# Patient Record
Sex: MALE | Race: WHITE | NOT HISPANIC OR LATINO | Employment: OTHER | URBAN - METROPOLITAN AREA
[De-identification: names, ages, dates, MRNs, and addresses within clinical notes are randomized per-mention and may not be internally consistent; named-entity substitution may affect disease eponyms.]

---

## 2020-08-01 ENCOUNTER — HOSPITAL ENCOUNTER (EMERGENCY)
Facility: HOSPITAL | Age: 75
Discharge: HOME | End: 2020-08-01
Attending: EMERGENCY MEDICINE | Admitting: EMERGENCY MEDICINE

## 2020-08-01 ENCOUNTER — APPOINTMENT (OUTPATIENT)
Dept: CT IMAGING | Facility: HOSPITAL | Age: 75
End: 2020-08-01

## 2020-08-01 VITALS
WEIGHT: 195 LBS | TEMPERATURE: 97.9 F | HEART RATE: 68 BPM | SYSTOLIC BLOOD PRESSURE: 187 MMHG | OXYGEN SATURATION: 98 % | HEIGHT: 75 IN | DIASTOLIC BLOOD PRESSURE: 99 MMHG | BODY MASS INDEX: 24.25 KG/M2 | RESPIRATION RATE: 18 BRPM

## 2020-08-01 DIAGNOSIS — G44.209 ACUTE NON INTRACTABLE TENSION-TYPE HEADACHE: Primary | ICD-10-CM

## 2020-08-01 LAB
ANION GAP SERPL CALCULATED.3IONS-SCNC: 10.3 MMOL/L (ref 5–15)
BASOPHILS # BLD AUTO: 0.04 10*3/MM3 (ref 0–0.2)
BASOPHILS NFR BLD AUTO: 0.9 % (ref 0–1.5)
BUN SERPL-MCNC: 21 MG/DL (ref 8–23)
BUN/CREAT SERPL: 18.8 (ref 7–25)
CALCIUM SPEC-SCNC: 8.9 MG/DL (ref 8.6–10.5)
CHLORIDE SERPL-SCNC: 100 MMOL/L (ref 98–107)
CO2 SERPL-SCNC: 21.7 MMOL/L (ref 22–29)
CREAT SERPL-MCNC: 1.12 MG/DL (ref 0.76–1.27)
DEPRECATED RDW RBC AUTO: 49.1 FL (ref 37–54)
EOSINOPHIL # BLD AUTO: 0.11 10*3/MM3 (ref 0–0.4)
EOSINOPHIL NFR BLD AUTO: 2.4 % (ref 0.3–6.2)
ERYTHROCYTE [DISTWIDTH] IN BLOOD BY AUTOMATED COUNT: 13.7 % (ref 12.3–15.4)
GFR SERPL CREATININE-BSD FRML MDRD: 64 ML/MIN/1.73
GLUCOSE SERPL-MCNC: 105 MG/DL (ref 65–99)
HCT VFR BLD AUTO: 35.8 % (ref 37.5–51)
HGB BLD-MCNC: 11.6 G/DL (ref 13–17.7)
IMM GRANULOCYTES # BLD AUTO: 0.01 10*3/MM3 (ref 0–0.05)
IMM GRANULOCYTES NFR BLD AUTO: 0.2 % (ref 0–0.5)
LYMPHOCYTES # BLD AUTO: 1.36 10*3/MM3 (ref 0.7–3.1)
LYMPHOCYTES NFR BLD AUTO: 29.6 % (ref 19.6–45.3)
MCH RBC QN AUTO: 31 PG (ref 26.6–33)
MCHC RBC AUTO-ENTMCNC: 32.4 G/DL (ref 31.5–35.7)
MCV RBC AUTO: 95.7 FL (ref 79–97)
MONOCYTES # BLD AUTO: 0.37 10*3/MM3 (ref 0.1–0.9)
MONOCYTES NFR BLD AUTO: 8 % (ref 5–12)
NEUTROPHILS NFR BLD AUTO: 2.71 10*3/MM3 (ref 1.7–7)
NEUTROPHILS NFR BLD AUTO: 58.9 % (ref 42.7–76)
PLATELET # BLD AUTO: 195 10*3/MM3 (ref 140–450)
PMV BLD AUTO: 8.7 FL (ref 6–12)
POTASSIUM SERPL-SCNC: 4.3 MMOL/L (ref 3.5–5.2)
RBC # BLD AUTO: 3.74 10*6/MM3 (ref 4.14–5.8)
SODIUM SERPL-SCNC: 132 MMOL/L (ref 136–145)
WBC # BLD AUTO: 4.6 10*3/MM3 (ref 3.4–10.8)

## 2020-08-01 PROCEDURE — 72129 CT CHEST SPINE W/DYE: CPT

## 2020-08-01 PROCEDURE — 99284 EMERGENCY DEPT VISIT MOD MDM: CPT

## 2020-08-01 PROCEDURE — 70450 CT HEAD/BRAIN W/O DYE: CPT

## 2020-08-01 PROCEDURE — 85025 COMPLETE CBC W/AUTO DIFF WBC: CPT | Performed by: EMERGENCY MEDICINE

## 2020-08-01 PROCEDURE — 80048 BASIC METABOLIC PNL TOTAL CA: CPT | Performed by: EMERGENCY MEDICINE

## 2020-08-01 PROCEDURE — 99282 EMERGENCY DEPT VISIT SF MDM: CPT | Performed by: EMERGENCY MEDICINE

## 2020-08-01 PROCEDURE — 0 IOPAMIDOL PER 1 ML: Performed by: EMERGENCY MEDICINE

## 2020-08-01 RX ORDER — SODIUM CHLORIDE 0.9 % (FLUSH) 0.9 %
10 SYRINGE (ML) INJECTION AS NEEDED
Status: DISCONTINUED | OUTPATIENT
Start: 2020-08-01 | End: 2020-08-01 | Stop reason: HOSPADM

## 2020-08-01 RX ORDER — OXYCODONE HYDROCHLORIDE AND ACETAMINOPHEN 5; 325 MG/1; MG/1
1 TABLET ORAL EVERY 6 HOURS PRN
COMMUNITY
End: 2021-04-25

## 2020-08-01 RX ORDER — CELECOXIB 200 MG/1
200 CAPSULE ORAL 2 TIMES DAILY
COMMUNITY

## 2020-08-01 RX ORDER — OXYCODONE HYDROCHLORIDE AND ACETAMINOPHEN 5; 325 MG/1; MG/1
1 TABLET ORAL ONCE
Status: COMPLETED | OUTPATIENT
Start: 2020-08-01 | End: 2020-08-01

## 2020-08-01 RX ADMIN — IOPAMIDOL 100 ML: 755 INJECTION, SOLUTION INTRAVENOUS at 19:39

## 2020-08-01 RX ADMIN — OXYCODONE AND ACETAMINOPHEN 1 TABLET: 5; 325 TABLET ORAL at 17:53

## 2020-08-02 NOTE — ED PROVIDER NOTES
Subjective   Rommel Santos is a 75 yo WM who present secondary to headache.  Insidious onset of headache yesterday.  Headache involves both frontal and occipital regions.  Headache continues today.  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  He and his family became concerned about his headache.  A family member had a forehead thermometer.  However it was set on Celsius.  The attempted to convert to Fahrenheit.  They felt the patient's temperature was 101.5.  However they then used an oral thermometer which returned a temperature of 97.  Nonetheless patient elected to come to the ER for evaluation.      History provided by:  Patient      Review of Systems   Constitutional: Negative for fever.   HENT: Negative for rhinorrhea.    Eyes: Negative for redness.   Respiratory: Negative for cough.    Cardiovascular: Negative for chest pain.   Gastrointestinal: Negative for abdominal pain.   Genitourinary: Negative for dysuria.   Musculoskeletal: Negative for myalgias.   Skin: Negative for rash.   Neurological: Positive for headaches. Negative for syncope.   All other systems reviewed and are negative.      Past Medical History:   Diagnosis Date   • Atrial fibrillation (CMS/HCC)    • Duodenal ulcer    • Urinary frequency        No Known Allergies    Past Surgical History:   Procedure Laterality Date   • CARDIAC ABLATION     • CHOLECYSTECTOMY     • GASTRECTOMY PARTIAL / TOTAL     • REPLACEMENT TOTAL KNEE Bilateral    • THORACIC SPINE SURGERY      arrachnoid bleb removed @ [facility]       History reviewed. No pertinent family history.    Social History     Socioeconomic History   • Marital status:      Spouse name: Not on file   • Number of children: Not on file   • Years of education: Not on file   • Highest education level: Not on file   Tobacco Use   • Smoking status: Former Smoker   Substance and Sexual Activity   • Alcohol use: Never     Frequency: Never   • Drug use: Not Currently   •  Sexual activity: Defer           Objective   Physical Exam   Constitutional: He is oriented to person, place, and time. He appears well-developed and well-nourished. No distress.   74-year-old white male lying in bed.  Patient appears in good overall health.  He is friendly and cooperative.  Vital signs are unremarkable.  Patient's temperature triage is 97.8.   HENT:   Head: Normocephalic and atraumatic.   Right Ear: External ear normal.   Left Ear: External ear normal.   Nose: Nose normal.   Mouth/Throat: Oropharynx is clear and moist.   Eyes: Pupils are equal, round, and reactive to light. Conjunctivae and EOM are normal.   Neck: Normal range of motion. Neck supple. No neck rigidity. Normal range of motion present. No Brudzinski's sign and no Kernig's sign noted.   Cardiovascular: Normal rate, regular rhythm, normal heart sounds and intact distal pulses. Exam reveals no gallop and no friction rub.   No murmur heard.  Pulmonary/Chest: Effort normal and breath sounds normal. No stridor. No respiratory distress. He has no wheezes. He has no rales.   Abdominal: Soft. He exhibits no distension. There is no tenderness.   Musculoskeletal: Normal range of motion. He exhibits no edema.        Thoracic back: He exhibits no swelling, no edema and no deformity.        Back:    Neurological: He is alert and oriented to person, place, and time. He has normal reflexes. No cranial nerve deficit.   Skin: Skin is warm and dry. No rash noted. He is not diaphoretic. No erythema.   Psychiatric: He has a normal mood and affect. His behavior is normal.   Nursing note and vitals reviewed.      Procedures           ED Course  ED Course as of Aug 01 2019   Sat Aug 01, 2020   1737 Kernig and Brezinski signs are negative.  No evidence of meningitis based on physical exam.  Patient is afebrile.  Have discussed with Dr. May-radiologist he recommends obtaining a CT head without contrast and a CT thoracic spine without and with contrast.   Obtaining baseline labs.  Have discussed with patient radiologist recommendations.  He is agreeable to testing.    [SS]   1834 CBC notable for minimal anemia with hemoglobin 11.6 hematocrit 35.8.  Sodium slightly low at 132.  CO2 21.7.  Glucose 105.  No significant electrolyte abnormalities.  Kidney function is normal with BUN of 21 creatinine 1.12    [SS]   2009 CT head shows only chronic ischemic changes.  CT C-spine shows postsurgical changes but otherwise unremarkable.  No sign of intracerebral hemorrhage.  No sign of postsurgical fluid collections.  I feel patient's headache is tension in nature.I have discussed at length with patient all results, diagnoses, treatment and follow-up.    Will DC home.Mr Santos is from [place].  He was a  in [place] for 32 years.  Moved to this area 6 weeks ago.    [SS]      ED Course User Index  [SS] Charles Watters MD      Labs Reviewed   BASIC METABOLIC PANEL - Abnormal; Notable for the following components:       Result Value    Glucose 105 (*)     Sodium 132 (*)     CO2 21.7 (*)     All other components within normal limits    Narrative:     GFR Normal >60  Chronic Kidney Disease <60  Kidney Failure <15     CBC WITH AUTO DIFFERENTIAL - Abnormal; Notable for the following components:    RBC 3.74 (*)     Hemoglobin 11.6 (*)     Hematocrit 35.8 (*)     All other components within normal limits   CBC AND DIFFERENTIAL    Narrative:     The following orders were created for panel order CBC & Differential.  Procedure                               Abnormality         Status                     ---------                               -----------         ------                     CBC Auto Differential[[id number]]        Abnormal            Final result                 Please view results for these tests on the individual orders.     Ct Head Without Contrast    Result Date: 8/1/2020  Narrative: PROCEDURE: CT Head WO COMPARISON: No relevant comparison or correlation  studies available at time of dictation. INDICATIONS: Acute headache today TECHNIQUE:  CT examination of the brain is performed without IV contrast. Radiation dose reduction techniques included automated exposure control or exposure modulation based on body size.  Count of known CT and cardiac nuc med studies performed in previous 12 months: 2.  FINDINGS:   There is no evidence of acute intracranial hemorrhage, mass effect or midline shift. No intra-axial or extra-axial fluid collections. There is mild cortical atrophy. Periventricular low attenuation is likely secondary to small vessel ischemic disease. The ventricular system is not dilated.  The calvarium is intact. Minimal opacification of the mastoid air cells seen inferiorly without bone destruction. For a     Impression: No acute intracranial process. Probable small vessel ischemic disease.  Signer Name: Fabiana Parham MD  Signed: 8/1/2020 7:37 PM  Workstation Name: [facility]  [facility]    Ct Thoracic Spine With Contrast    Result Date: 8/1/2020  Narrative: PROCEDURE: CT Spine Thoracic W COMPARISON: No prior studies are relevant studies available for correlation purposes at the time of this dictation. INDICATIONS: Mid-back/thoracic spine pain with associated headaches that started today. Exam done with and wo contrast. ;Mid T-spine pain and HA x today;5 weeks post op arachnoid web surgery;No trauma TECHNIQUE:  Routine CT of the thoracic is performed without contrast. Sagittal and coronal reconstructions performed and reviewed. Radiation dose reduction techniques included automated exposure control or exposure modulation based on body size.  Count of known CT and cardiac nuc med studies performed in previous 12 months: 2. . FINDINGS: Postoperative change noted with posterior element resection from T5 to T7. Nonrim-enhancing fluid density seen in the postoperative defect. Calcification seen along the posterior aspect of the cord at  T7, indeterminant. Prior imaging not available. There is no acute malalignment. Vertebral body heights are fairly well-preserved in the thoracic spine.  No paravertebral hematoma present.There is no acute fracture of the thoracic spine. No abnormal enhancement present. Mild compression deformity of L1 noted, age indeterminant. Right trace pleural effusions identified. Basilar airspace disease present. Numerous clips seen at the GE junction. Incidental benign type I Bosniak simple cyst seen right kidney; macro no follow-up.     Impression: 1. Compression deformity of L1 seen of indeterminate age without comparisons, correlate with past medical history. 2. Evidence of postoperative change in the posterior elements of T7 with no abnormal enhancement. Signer Name: Fabiana Parham MD  Signed: 8/1/2020 7:49 PM  Workstation Name: [facility]  [facility] [place]    My differential diagnosis for headache includes but is not limited to:  Migraine, cluster, ischemic stroke, subarachnoid hemorrhage, intracranial hemorrhage, vascular malformation, cerebral aneurysm, vascular dissection, vasculitis, temporal arteritis, malignant hypertension, pheochromocytoma, cerebral venous thrombosis, preeclampsia; bacterial meningitis, viral meningitis, fungal meningitis, encephalitis, brain abscess, pleural empyema, sinusitis, dental infection, influenza, viral syndrome; carbon monoxide exposure, analgesic abuse, hypoglycemia; trigeminal neuralgia, postherpetic neuralgia, occipital neuralgia; subdural hematoma, concussion, musculoskeletal tension, cervical osteoarthritis; glaucoma, TMJ disease, pseudotumor cerebri, post LP headache, intracranial neoplasm, sleep apnea    My differential diagnosis for back pain includes but is not limited to:  Musculoskeletal strain, contusion, retroperitoneal hematoma, disc protrusion, vertebral fracture, transverse process fracture, rib fracture, facet syndrome, sacroiliac joint strain,  sciatica, renal injury, splenic injury, pancreatic injury, osteoarthritis, lumbar spondylosis, spinal stenosis, ankylosing spondylitis, sacroiliac joint inflammation, pancreatitis, perforated peptic ulcer, diverticulitis, endometriosis, chronic PID, epidural abscess, osteomyelitis, retroperitoneal abscess, pyelonephritis, pneumonia, subphrenic abscess, tuberculosis, neurofibroma, meningioma, multiple myeloma, lymphoma, metastatic cancer, primary cancer, AAA, aortic dissection, spinal ischemia, referred pain, ureterolithiasis                                       MDM    Final diagnoses:   Acute non intractable tension-type headache            Charles Watters MD  08/01/20 2019

## 2020-08-02 NOTE — DISCHARGE INSTRUCTIONS
Continue current medications.  Follow-up with your neurosurgeon as above.  Return to ED for worsening symptoms, medical emergencies.

## 2021-04-25 ENCOUNTER — APPOINTMENT (OUTPATIENT)
Dept: GENERAL RADIOLOGY | Facility: HOSPITAL | Age: 76
End: 2021-04-25

## 2021-04-25 ENCOUNTER — HOSPITAL ENCOUNTER (EMERGENCY)
Facility: HOSPITAL | Age: 76
Discharge: HOME | End: 2021-04-25
Attending: EMERGENCY MEDICINE | Admitting: EMERGENCY MEDICINE

## 2021-04-25 VITALS
TEMPERATURE: 97.6 F | HEART RATE: 70 BPM | OXYGEN SATURATION: 98 % | SYSTOLIC BLOOD PRESSURE: 169 MMHG | RESPIRATION RATE: 16 BRPM | WEIGHT: 200 LBS | HEIGHT: 71 IN | DIASTOLIC BLOOD PRESSURE: 86 MMHG | BODY MASS INDEX: 28 KG/M2

## 2021-04-25 DIAGNOSIS — K59.00 CONSTIPATION, UNSPECIFIED CONSTIPATION TYPE: Primary | ICD-10-CM

## 2021-04-25 LAB
BASOPHILS # BLD AUTO: 0.06 10*3/MM3 (ref 0–0.2)
BASOPHILS NFR BLD AUTO: 1 % (ref 0–1.5)
DEPRECATED RDW RBC AUTO: 48.7 FL (ref 37–54)
EOSINOPHIL # BLD AUTO: 0.19 10*3/MM3 (ref 0–0.4)
EOSINOPHIL NFR BLD AUTO: 3.3 % (ref 0.3–6.2)
ERYTHROCYTE [DISTWIDTH] IN BLOOD BY AUTOMATED COUNT: 13.5 % (ref 12.3–15.4)
HCT VFR BLD AUTO: 42.9 % (ref 37.5–51)
HGB BLD-MCNC: 14.1 G/DL (ref 13–17.7)
IMM GRANULOCYTES # BLD AUTO: 0.02 10*3/MM3 (ref 0–0.05)
IMM GRANULOCYTES NFR BLD AUTO: 0.3 % (ref 0–0.5)
LYMPHOCYTES # BLD AUTO: 2.47 10*3/MM3 (ref 0.7–3.1)
LYMPHOCYTES NFR BLD AUTO: 42.7 % (ref 19.6–45.3)
MCH RBC QN AUTO: 32 PG (ref 26.6–33)
MCHC RBC AUTO-ENTMCNC: 32.9 G/DL (ref 31.5–35.7)
MCV RBC AUTO: 97.3 FL (ref 79–97)
MONOCYTES # BLD AUTO: 0.66 10*3/MM3 (ref 0.1–0.9)
MONOCYTES NFR BLD AUTO: 11.4 % (ref 5–12)
NEUTROPHILS NFR BLD AUTO: 2.39 10*3/MM3 (ref 1.7–7)
NEUTROPHILS NFR BLD AUTO: 41.3 % (ref 42.7–76)
NRBC BLD AUTO-RTO: 0 /100 WBC (ref 0–0.2)
PLATELET # BLD AUTO: 233 10*3/MM3 (ref 140–450)
PMV BLD AUTO: 9.1 FL (ref 6–12)
RBC # BLD AUTO: 4.41 10*6/MM3 (ref 4.14–5.8)
WBC # BLD AUTO: 5.79 10*3/MM3 (ref 3.4–10.8)

## 2021-04-25 PROCEDURE — 85025 COMPLETE CBC W/AUTO DIFF WBC: CPT | Performed by: PHYSICIAN ASSISTANT

## 2021-04-25 PROCEDURE — 36415 COLL VENOUS BLD VENIPUNCTURE: CPT

## 2021-04-25 PROCEDURE — 99283 EMERGENCY DEPT VISIT LOW MDM: CPT

## 2021-04-25 PROCEDURE — 74019 RADEX ABDOMEN 2 VIEWS: CPT

## 2021-04-25 RX ORDER — TIZANIDINE 2 MG/1
2 TABLET ORAL EVERY 8 HOURS PRN
COMMUNITY
Start: 2021-02-19

## 2021-04-25 RX ORDER — UBIDECARENONE 75 MG
100 CAPSULE ORAL DAILY
COMMUNITY

## 2021-04-25 RX ORDER — PSEUDOEPHEDRINE HCL 30 MG
200 TABLET ORAL 2 TIMES DAILY PRN
COMMUNITY

## 2021-04-25 RX ORDER — SENNA PLUS 8.6 MG/1
8.6 TABLET ORAL DAILY
COMMUNITY

## 2021-04-25 RX ORDER — ASPIRIN 81 MG/1
81 TABLET ORAL DAILY
COMMUNITY

## 2021-04-25 RX ORDER — CHLORAL HYDRATE 500 MG
1 CAPSULE ORAL DAILY
COMMUNITY

## 2021-04-25 NOTE — ED NOTES
Pt unable to hold soap hollis enema fluid. Output fluids blood tinged. Grace ROSADO notified & @ bedside. Enema stopped. Pt received about 3/4 of fluid. Pt on bedside commode @ this time with no c/o pain. Will continue to monitor     Jessica Salcedo, RN  04/25/21 [id number]

## 2021-04-25 NOTE — ED PROVIDER NOTES
EMERGENCY DEPARTMENT ENCOUNTER      Room Number: 09/09    History is provided by the patient, no translation services needed    HPI:    Chief complaint: Constipation    Location: At home    Quality/Severity: Moderate    Timing/Duration: 3 days    Modifying Factors: Patient has tried mag citrate, Senokot, stool softeners and an enema without relief    Associated Symptoms: No abdominal pain no rectal bleeding    Narrative: Pt is a 75 y.o. male who presents complaining of constipation x3 days.  Patient dates he is been unable to have his bowel movement.  Patient states that he steps tried several times with different things but has had no success.  Patient denies any abdominal pain or rectal bleeding.      PMD: Sue Auguste MD    REVIEW OF SYSTEMS  Review of Systems   Constitutional: Negative for chills and fever.   Eyes: Negative for pain and visual disturbance.   Respiratory: Negative for cough and shortness of breath.    Cardiovascular: Negative for chest pain and palpitations.   Gastrointestinal: Positive for constipation. Negative for abdominal pain, nausea and vomiting.   Genitourinary: Negative for difficulty urinating, dysuria and flank pain.   Musculoskeletal: Negative for gait problem and myalgias.   Skin: Negative for rash and wound.   Neurological: Negative for dizziness, syncope, numbness and headaches.   Psychiatric/Behavioral: Negative for confusion and suicidal ideas. The patient is not nervous/anxious.          PAST MEDICAL HISTORY  Active Ambulatory Problems     Diagnosis Date Noted   • No Active Ambulatory Problems     Resolved Ambulatory Problems     Diagnosis Date Noted   • No Resolved Ambulatory Problems     Past Medical History:   Diagnosis Date   • Atrial fibrillation (CMS/HCC)    • Duodenal ulcer    • Urinary frequency        PAST SURGICAL HISTORY  Past Surgical History:   Procedure Laterality Date   • BACK SURGERY     • CARDIAC ABLATION     • CHOLECYSTECTOMY     • GASTRECTOMY  PARTIAL / TOTAL     • REPLACEMENT TOTAL KNEE Bilateral    • THORACIC SPINE SURGERY      arrachnoid bleb removed @ [facility]       FAMILY HISTORY  History reviewed. No pertinent family history.    SOCIAL HISTORY  Social History     Socioeconomic History   • Marital status:      Spouse name: Not on file   • Number of children: Not on file   • Years of education: Not on file   • Highest education level: Not on file   Tobacco Use   • Smoking status: Former Smoker   Substance and Sexual Activity   • Alcohol use: Never   • Drug use: Not Currently   • Sexual activity: Defer       ALLERGIES  Patient has no known allergies.    No current facility-administered medications for this encounter.    Current Outpatient Medications:   •  celecoxib (CeleBREX) 200 MG capsule, Take 200 mg by mouth 2 (Two) Times a Day., Disp: , Rfl:   •  tiZANidine (ZANAFLEX) 2 MG tablet, Take 2 mg by mouth Every 8 (Eight) Hours As Needed., Disp: , Rfl:   •  aspirin (aspirin) 81 MG EC tablet, Take 81 mg by mouth Daily., Disp: , Rfl:   •  docusate sodium 100 MG capsule, Take 200 mg by mouth 2 (Two) Times a Day As Needed., Disp: , Rfl:   •  Omega-3 Fatty Acids (fish oil) 1000 MG capsule capsule, Take 1 capsule by mouth Daily., Disp: , Rfl:   •  polyethylene glycol (GoLYTELY) 236 g solution, Take 4,000 mL by mouth 1 (One) Time for 1 dose., Disp: 4000 mL, Rfl: 0  •  senna (Senna-Time) 8.6 MG tablet, Take 8.6 mg by mouth Daily., Disp: , Rfl:   •  vitamin B-12 (cyanocobalamin) 100 MCG tablet, Take 100 mcg by mouth Daily., Disp: , Rfl:     PHYSICAL EXAM  ED Triage Vitals   Temp Heart Rate Resp BP SpO2   04/25/21 1222 04/25/21 1233 04/25/21 1233 04/25/21 1233 04/25/21 1233   97.9 °F (36.6 °C) 90 18 131/77 96 %      Temp src Heart Rate Source Patient Position BP Location FiO2 (%)   04/25/21 1222 04/25/21 1233 04/25/21 1233 04/25/21 1233 --   Skin Monitor Sitting Left arm        Physical Exam  Vitals and nursing note reviewed.   HENT:      Head:  Normocephalic and atraumatic.   Eyes:      Conjunctiva/sclera: Conjunctivae normal.   Cardiovascular:      Rate and Rhythm: Normal rate and regular rhythm.      Heart sounds: Normal heart sounds.   Pulmonary:      Effort: Pulmonary effort is normal. No respiratory distress.      Breath sounds: Normal breath sounds.   Abdominal:      General: Bowel sounds are normal. There is no distension.      Palpations: Abdomen is soft.      Tenderness: There is no abdominal tenderness.   Musculoskeletal:         General: Normal range of motion.      Cervical back: Normal range of motion and neck supple.   Skin:     General: Skin is warm and dry.   Neurological:      Mental Status: He is alert and oriented to person, place, and time.   Psychiatric:         Mood and Affect: Mood and affect normal.           LAB RESULTS  Lab Results (last 24 hours)     Procedure Component Value Units Date/Time    CBC & Differential [[id number]]  (Abnormal) Collected: 04/25/21 1412    Specimen: Blood Updated: 04/25/21 1419    Narrative:      The following orders were created for panel order CBC & Differential.  Procedure                               Abnormality         Status                     ---------                               -----------         ------                     CBC Auto Differential[[id number]]        Abnormal            Final result                 Please view results for these tests on the individual orders.    CBC Auto Differential [[id number]]  (Abnormal) Collected: 04/25/21 1412    Specimen: Blood Updated: 04/25/21 1419     WBC 5.79 10*3/mm3      RBC 4.41 10*6/mm3      Hemoglobin 14.1 g/dL      Hematocrit 42.9 %      MCV 97.3 fL      MCH 32.0 pg      MCHC 32.9 g/dL      RDW 13.5 %      RDW-SD 48.7 fl      MPV 9.1 fL      Platelets 233 10*3/mm3      Neutrophil % 41.3 %      Lymphocyte % 42.7 %      Monocyte % 11.4 %      Eosinophil % 3.3 %      Basophil % 1.0 %      Immature Grans % 0.3 %      Neutrophils, Absolute 2.39  10*3/mm3      Lymphocytes, Absolute 2.47 10*3/mm3      Monocytes, Absolute 0.66 10*3/mm3      Eosinophils, Absolute 0.19 10*3/mm3      Basophils, Absolute 0.06 10*3/mm3      Immature Grans, Absolute 0.02 10*3/mm3      nRBC 0.0 /100 WBC             I ordered the above labs and reviewed the results    RADIOLOGY  XR Abdomen Flat & Upright    Result Date: 4/25/2021  CR Abdomen AP W Decub or Erect INDICATION: Constipation 4 days. History of duodenal ulcer repair and cholecystectomy. COMPARISON: None available FINDINGS: Upright and supine AP radiographs of the abdomen. Nonobstructive bowel gas pattern. Increased colonic stool burden consistent with patient's history of constipation but no evidence of impaction. No organomegaly. No abnormal calcifications. Surgical clips distal esophagus and GE junction. Presumed embolization coils left upper quadrant. Multilevel degenerative changes lumbar spine. Lung bases unremarkable. No free air.     Increased colonic stool burden consistent with reported constipation. No impaction or high-grade obstruction. Signer Name: YANIQUE Luevano MD  Signed: 4/25/2021 2:07 PM  Workstation Name: [email or web address]  [facility] [place]      I ordered the above radiologic testing and reviewed the results    PROCEDURES  Procedures      PROGRESS AND CONSULTS  ED Course as of Apr 25 1854   Sun Apr 25, 2021   1850 75-year-old male presents to the ED with complaints of not having a bowel movement x3 days.  Patient states that he has tried Senokot, magnesium citrate, and enema without relief.  Patient denies any rectal bleeding or abdominal pain.  After history and physical exam and x-ray was ordered to rule out fecal impaction.  Patient was also given an enema with no relief.  X-ray showed no acute fecal impaction.  Patient was sent home with [employer].  Gave patient instructions to drink 8 ounce glass of GoLYTELY every half an hour until he has a bowel movement.  And then to discuss  discontinue.  Instructed patient that he should return to the ED if he had any worsening symptoms.  Patient expressed verbal understanding of plan of care.    [GT]      ED Course User Index  [GT] Sandra Guthrie PA-C           MEDICAL DECISION MAKING    MDM       DIAGNOSIS  Final diagnoses:   Constipation, unspecified constipation type       Latest Documented Vital Signs:  As of 18:54 EDT  BP- 169/86 HR- 70 Temp- 97.6 °F (36.4 °C) (Oral) O2 sat- 98%    DISPOSITION  Discharged home        Discussed pertinent findings with the patient/family.  Patient/Family voiced understanding of need to follow-up for recheck and further testing as needed.  Return to the Emergency Department warnings were given.         Medication List      New Prescriptions    polyethylene glycol 236 g solution  Commonly known as: GoLYTELY  Take 4,000 mL by mouth 1 (One) Time for 1 dose.           Where to Get Your Medications      You can get these medications from any pharmacy    Bring a paper prescription for each of these medications  · polyethylene glycol 236 g solution             Follow-up Information     Sue Auguste MD. Call in 1 day.    Specialty: Family Medicine  Why: To schedule a follow up appointment  Contact information:  [address] [place] [address]  [phone]                     Dictated utilizing Dragon dictation     Sandra Guthrie PA-C  04/25/21 4803

## 2021-04-25 NOTE — ED NOTES
Spoke to patients wife Coleen with pt's permission. Updated her on pt status and all questions answered.      Jessica Salcedo, RN  04/25/21 3882

## 2021-06-21 ENCOUNTER — OFFICE VISIT (OUTPATIENT)
Dept: GASTROENTEROLOGY | Facility: CLINIC | Age: 76
End: 2021-06-21

## 2021-06-21 VITALS — WEIGHT: 197.8 LBS | BODY MASS INDEX: 27.69 KG/M2 | HEIGHT: 71 IN

## 2021-06-21 DIAGNOSIS — K59.00 CONSTIPATION, UNSPECIFIED CONSTIPATION TYPE: Primary | ICD-10-CM

## 2021-06-21 PROCEDURE — 99204 OFFICE O/P NEW MOD 45 MIN: CPT | Performed by: NURSE PRACTITIONER

## 2021-06-21 RX ORDER — CHLORAL HYDRATE 500 MG
1 CAPSULE ORAL DAILY
COMMUNITY

## 2021-06-21 RX ORDER — MAGNESIUM 200 MG
400 TABLET ORAL DAILY
COMMUNITY
End: 2022-04-03

## 2021-06-21 RX ORDER — ACETAMINOPHEN 325 MG/1
650 TABLET ORAL EVERY 4 HOURS PRN
COMMUNITY

## 2021-06-21 RX ORDER — BACLOFEN 10 MG/1
TABLET ORAL
COMMUNITY
Start: 2021-05-24 | End: 2021-06-21

## 2021-06-21 NOTE — PROGRESS NOTES
PATIENT INFORMATION  Rommel Santos     - 1945    CHIEF COMPLAINT  Chief Complaint   Patient presents with   • Constipation     referral from Dr. Auguste       HISTORY OF PRESENT ILLNESS  Life history of constipation managed with diet and exercise.  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 and had surgery about a year ago for removal.  Last MRI was 3 months ago without regrowth evidence.    Constipation worsened 3 months ago and has tried senna and dulcolax, benefiber, glycolax.  Stopped everything about 3 days ago but increased his fluids and last 3 days has had bms.  Has appt with Dr.Brooks Greenberg for scope and urolift with his enlarged prostate issues that keep him away with increased fluid intake.  Has been drinking 4 16oz bottles of water throughout the day.  And this has fixed his bowels.      Also recent issues with hb, acid reflux,  Along with the constipation.     Last colon was in  with 10 yr recall.       REVIEWED PERTINENT RESULTS/ LABS  No results found for: CASEREPORT, FINALDX  Lab Results   Component Value Date    HGB 14.1 2021    MCV 97.3 (H) 2021     2021      No results found.    CURRENT MEDICATIONS    Current Outpatient Medications:   •  acetaminophen (TYLENOL) 325 MG tablet, Take 650 mg by mouth Every 4 (Four) Hours As Needed., Disp: , Rfl:   •  aspirin (aspirin) 81 MG EC tablet, Take 81 mg by mouth Daily., Disp: , Rfl:   •  celecoxib (CeleBREX) 200 MG capsule, Take 200 mg by mouth 2 (Two) Times a Day., Disp: , Rfl:   •  docusate sodium 100 MG capsule, Take 200 mg by mouth 2 (Two) Times a Day As Needed., Disp: , Rfl:   •  Magnesium 200 MG tablet, Take 400 mg by mouth Daily., Disp: , Rfl:   •  Omega-3 1000 MG capsule, Take 1 capsule by mouth Daily., Disp: , Rfl:   •  Omega-3 Fatty Acids (fish oil) 1000 MG capsule capsule, Take 1 capsule by mouth Daily., Disp: ,  Rfl:   •  senna (Senna-Time) 8.6 MG tablet, Take 8.6 mg by mouth Daily., Disp: , Rfl:   •  tiZANidine (ZANAFLEX) 2 MG tablet, Take 2 mg by mouth Every 8 (Eight) Hours As Needed., Disp: , Rfl:   •  vitamin B-12 (cyanocobalamin) 100 MCG tablet, Take 100 mcg by mouth Daily., Disp: , Rfl:     ALLERGIES  Patient has no known allergies.    REVIEW OF SYSTEMS  ROS: 14 point review of systems was performed and all other systems were reviewed and are negative except for documented findings in HPI and today's encounter.   Review of Systems   Constitutional: Negative for activity change, chills, fever and unexpected weight change.   HENT: Negative for congestion.    Eyes: Negative for visual disturbance.   Respiratory: Negative for shortness of breath.    Cardiovascular: Negative for chest pain and palpitations.   Gastrointestinal: Positive for constipation and diarrhea. Negative for abdominal pain and blood in stool.   Endocrine: Negative for cold intolerance and heat intolerance.   Genitourinary: Negative for hematuria.   Musculoskeletal: Negative for gait problem.   Skin: Negative for color change.   Allergic/Immunologic: Negative for immunocompromised state.   Neurological: Negative for weakness and light-headedness.   Hematological: Negative for adenopathy.   Psychiatric/Behavioral: Negative for sleep disturbance. The patient is not nervous/anxious.          History     Last Reviewed by Lizzeth Valdes APRN on 6/21/2021 at 11:20 AM    Sections Reviewed    Tobacco, Family, Medical, Surgical      Problem list reviewed by Lizzeth Valdes APRN on 6/21/2021 at 11:20 AM  Medicines reviewed by Lizzeth Valdes APRN on 6/21/2021 at 11:20 AM  Allergies reviewed by Lizzeth Valdes APRN on 6/21/2021 at 11:20 AM      ACTIVE PROBLEMS  There are no problems to display for this patient.        PAST MEDICAL HISTORY  Past Medical History:   Diagnosis Date   • Atrial fibrillation (CMS/HCC)    • Constipation    • Duodenal ulcer    • Urinary  frequency          SURGICAL HISTORY  Past Surgical History:   Procedure Laterality Date   • BACK SURGERY     • CARDIAC ABLATION     • CHOLECYSTECTOMY     • GASTRECTOMY PARTIAL / TOTAL     • REPLACEMENT TOTAL KNEE Bilateral    • THORACIC SPINE SURGERY      arrachnoid bleb removed @ [facility]         FAMILY HISTORY  Family History   Problem Relation Age of Onset   • No Known Problems Mother    • Heart disease Father    • Irritable bowel syndrome Sister    • Heart disease Brother          SOCIAL HISTORY  Social History     Occupational History   • Not on file   Tobacco Use   • Smoking status: Former Smoker   • Smokeless tobacco: Never Used   Substance and Sexual Activity   • Alcohol use: Not Currently     Comment: formerly   • Drug use: Not Currently   • Sexual activity: Defer         LAST RESULTS  See also labs reviewed above.   Admission on 04/25/2021, Discharged on 04/25/2021   Component Date Value Ref Range Status   • WBC 04/25/2021 5.79  3.40 - 10.80 10*3/mm3 Final   • RBC 04/25/2021 4.41  4.14 - 5.80 10*6/mm3 Final   • Hemoglobin 04/25/2021 14.1  13.0 - 17.7 g/dL Final   • Hematocrit 04/25/2021 42.9  37.5 - 51.0 % Final   • MCV 04/25/2021 97.3* 79.0 - 97.0 fL Final   • MCH 04/25/2021 32.0  26.6 - 33.0 pg Final   • MCHC 04/25/2021 32.9  31.5 - 35.7 g/dL Final   • RDW 04/25/2021 13.5  12.3 - 15.4 % Final   • RDW-SD 04/25/2021 48.7  37.0 - 54.0 fl Final   • MPV 04/25/2021 9.1  6.0 - 12.0 fL Final   • Platelets 04/25/2021 233  140 - 450 10*3/mm3 Final   • Neutrophil % 04/25/2021 41.3* 42.7 - 76.0 % Final   • Lymphocyte % 04/25/2021 42.7  19.6 - 45.3 % Final   • Monocyte % 04/25/2021 11.4  5.0 - 12.0 % Final   • Eosinophil % 04/25/2021 3.3  0.3 - 6.2 % Final   • Basophil % 04/25/2021 1.0  0.0 - 1.5 % Final   • Immature Grans % 04/25/2021 0.3  0.0 - 0.5 % Final   • Neutrophils, Absolute 04/25/2021 2.39  1.70 - 7.00 10*3/mm3 Final   • Lymphocytes, Absolute 04/25/2021 2.47  0.70 - 3.10 10*3/mm3 Final   • Monocytes,  "Absolute 04/25/2021 0.66  0.10 - 0.90 10*3/mm3 Final   • Eosinophils, Absolute 04/25/2021 0.19  0.00 - 0.40 10*3/mm3 Final   • Basophils, Absolute 04/25/2021 0.06  0.00 - 0.20 10*3/mm3 Final   • Immature Grans, Absolute 04/25/2021 0.02  0.00 - 0.05 10*3/mm3 Final   • nRBC 04/25/2021 0.0  0.0 - 0.2 /100 WBC Final     No results found.    PHYSICAL EXAM  Debilities/Disabilities Identified: None  Emotional Behavior: Appropriate  75 y.o. male  Wt Readings from Last 3 Encounters:   06/21/21 89.7 kg (197 lb 12.8 oz)   04/25/21 90.7 kg (200 lb)   08/01/20 88.5 kg (195 lb)     Ht Readings from Last 1 Encounters:   06/21/21 180 cm (70.87\")     Body mass index is 27.69 kg/m².  Vitals:    06/21/21 1003   Weight: 89.7 kg (197 lb 12.8 oz)   Height: 180 cm (70.87\")     Vital signs reviewed.          Physical Exam  Vitals and nursing note reviewed.   Constitutional:       Appearance: He is well-developed.   HENT:      Head: Normocephalic and atraumatic.   Eyes:      Conjunctiva/sclera: Conjunctivae normal.      Pupils: Pupils are equal, round, and reactive to light.   Cardiovascular:      Rate and Rhythm: Normal rate and regular rhythm.   Pulmonary:      Effort: Pulmonary effort is normal.      Breath sounds: Normal breath sounds.   Abdominal:      General: Bowel sounds are normal. There is no distension.      Palpations: Abdomen is soft.      Tenderness: There is abdominal tenderness in the epigastric area.   Musculoskeletal:         General: Normal range of motion.      Cervical back: Normal range of motion and neck supple.   Lymphadenopathy:      Cervical: No cervical adenopathy.   Skin:     General: Skin is warm and dry.   Neurological:      Mental Status: He is alert and oriented to person, place, and time.   Psychiatric:         Behavior: Behavior normal.           CLINICAL DATA REVIEWED   reviewed previous lab results and integrated with today's visit, reviewed notes from other physicians and/or last GI encounter, reviewed " "previous endoscopy results and available photos, reviewed surgical pathology results from previous biopsies      ASSESSMENT  Diagnoses and all orders for this visit:    Constipation, unspecified constipation type    Other orders  -     Magnesium 200 MG tablet; Take 400 mg by mouth Daily.  -     acetaminophen (TYLENOL) 325 MG tablet; Take 650 mg by mouth Every 4 (Four) Hours As Needed.  -     Discontinue: baclofen (LIORESAL) 10 MG tablet  -     Omega-3 1000 MG capsule; Take 1 capsule by mouth Daily.          PLAN  Return in about 3 months (around 9/21/2021).     Strongly feel that the constipation is due to inadequate fluid intake which was reduced for your prostate issues.  So best to continue with your plans for treatement and increase your daily fluid intake like you have been doing.  You need at least 8-10 8oz glasses of water daily for your bowels.      Colace (docusate sodium)  100mg 2-3 a day is ok.  IF this does not work you may take Daily miralax 1-4 scoops a day as needed helps to hydrate the colon as well.  Use OTC Gas X, extra strength 120mg you can take 2 pills twice a day.  AVoid senna and dulcolax except for constipation emergencies as these are bowel stimulants.    Increase your daily fiber intake and suggestions below.    Low Acid Diet Instructions:   Don't eat late, don't eat fried or spicy, and don't eat too much at one time. Avoid alcohol and  tomato based products like pizza, lasagna, spaghetti.  If you want to have these take an over the counter Pepcid or TUMS immediately before you eat them.  Do not eat within 3-4 hrs of bedtime. Limit caffeine and carbonated beverages, if you must have them do not have later in the day.  If reflux is severe elevate the head of the bed. You may also use TUMS, maalox, or mylanta for breakthrough heartburn.    Take a daily probiotic (something with a billion cultures and more different strains is better, examples like \"Probiotic 10\" or probiotic gummies) for your " gut as well.  AVOID taking NSAIDS (like ibuprofen, Aleve, Motrin, naproxen, meloxicam, etc) as much as possible and use acetaminophen (Tylenol) instead.    Drink lots of water, eat a high fiber diet with 30-35gm a day(check the fiber content in the foods you eat.  Protein bars with 15gm of fiber in each bar are a great supplement daily), and get regular exercise. Use over the counter simethicone xtra strength gas x (125-180mg) 2 twice a day as needed for gas.     High Fiber Food suggestions:  Beans, nuts, vegetables, whole grains, flax seed and sushila seeds (which can be stirred into other food) are high in fiber.    Iraheta Protein bars from [employer] = 10gm of fiber in each bar (also gluten free)  Quest Protein bars from grocery [employer], Griselda, [employer]= 15gm of fiber each (also gluten free)  Fiber One bars from grocery [employer] = 5-6gm of fiber each  Kelloggs Bran Buds cereal 1/2 cup = 17gm of fiber  Kroger brand low sugar Craisins = 13gm of fiber per serving  Melalueca Fiberwise powder found on [employer]=15gm fiber per serving (2 scoops)  CarbBalance tortillas= 15gm of fiber each  Natural Ovens Keto-Friendly Bread found at [employer]=12gm fiber per serving  No Garfield vegan bar at [employer]=15gm fiber per serving      I have discussed the above plan with the patient.  They verbalize understanding and are in agreement with the plan.  They have been advised to contact the office for any questions, concerns, or changes related to their health.    38 min spent with patient today >50% spent counseling about natural history and expected course of assessed complaint and reviewed treatment options that have been tried and not tried and those currently available. Questions answered.

## 2021-06-21 NOTE — PATIENT INSTRUCTIONS
"Strongly feel that the constipation is due to inadequate fluid intake which was reduced for your prostate issues.  So best to continue with your plans for treatement and increase your daily fluid intake like you have been doing.  You need at least 8-10 8oz glasses of water daily for your bowels.      Colace (docusate sodium)  100mg 2-3 a day is ok.  IF this does not work you may take Daily miralax 1-4 scoops a day as needed helps to hydrate the colon as well.  Use OTC Gas X, extra strength 120mg you can take 2 pills twice a day.  AVoid senna and dulcolax except for constipation emergencies as these are bowel stimulants.    Increase your daily fiber intake and suggestions below.    Low Acid Diet Instructions:   Don't eat late, don't eat fried or spicy, and don't eat too much at one time. Avoid alcohol and  tomato based products like pizza, lasagna, spaghetti.  If you want to have these take an over the counter Pepcid or TUMS immediately before you eat them.  Do not eat within 3-4 hrs of bedtime. Limit caffeine and carbonated beverages, if you must have them do not have later in the day.  If reflux is severe elevate the head of the bed. You may also use TUMS, maalox, or mylanta for breakthrough heartburn.    Take a daily probiotic (something with a billion cultures and more different strains is better, examples like \"Probiotic 10\" or probiotic gummies) for your gut as well.  AVOID taking NSAIDS (like ibuprofen, Aleve, Motrin, naproxen, meloxicam, etc) as much as possible and use acetaminophen (Tylenol) instead.    Drink lots of water, eat a high fiber diet with 30-35gm a day(check the fiber content in the foods you eat.  Protein bars with 15gm of fiber in each bar are a great supplement daily), and get regular exercise. Use over the counter simethicone xtra strength gas x (125-180mg) 2 twice a day as needed for gas.     High Fiber Food suggestions:  Beans, nuts, vegetables, whole grains, flax seed and sushila seeds " (which can be stirred into other food) are high in fiber.    Iraheta Protein bars from [facility] = 10gm of fiber in each bar (also gluten free)  Quest Protein bars from grocery stores [employer], [employer], [employer]= 15gm of fiber each (also gluten free)  Fiber One bars from grocery stores = 5-6gm of fiber each  Kelloggs Bran Buds cereal 1/2 cup = 17gm of fiber  K[employer]ger brand low sugar Craisins = 13gm of fiber per serving  Melalueca Fiberwise powder found on [employer]=15gm fiber per serving (2 scoops)  CarbBalance tortillas= 15gm of fiber each  Natural Ovens Keto-Friendly Bread found at [facility]=12gm fiber per serving  No Garfield vegan bar at [facility]=15gm fiber per serving

## 2021-07-27 ENCOUNTER — TRANSCRIBE ORDERS (OUTPATIENT)
Dept: ADMINISTRATIVE | Facility: HOSPITAL | Age: 76
End: 2021-07-27

## 2021-07-27 DIAGNOSIS — I73.9 CLAUDICATION (HCC): Primary | ICD-10-CM

## 2021-08-05 ENCOUNTER — HOSPITAL ENCOUNTER (OUTPATIENT)
Dept: ULTRASOUND IMAGING | Facility: HOSPITAL | Age: 76
Discharge: HOME | End: 2021-08-05
Admitting: FAMILY MEDICINE

## 2021-08-05 DIAGNOSIS — I73.9 CLAUDICATION (HCC): ICD-10-CM

## 2021-08-05 PROCEDURE — 93923 UPR/LXTR ART STDY 3+ LVLS: CPT

## 2021-08-23 ENCOUNTER — TRANSCRIBE ORDERS (OUTPATIENT)
Dept: ADMINISTRATIVE | Facility: HOSPITAL | Age: 76
End: 2021-08-23

## 2021-08-23 DIAGNOSIS — Q05.6: Primary | ICD-10-CM

## 2021-09-03 ENCOUNTER — HOSPITAL ENCOUNTER (OUTPATIENT)
Dept: MRI IMAGING | Facility: HOSPITAL | Age: 76
Discharge: HOME | End: 2021-09-03
Admitting: FAMILY MEDICINE

## 2021-09-03 DIAGNOSIS — Q05.6: ICD-10-CM

## 2021-09-03 LAB — CREAT BLDA-MCNC: 1.7 MG/DL (ref 0.6–1.3)

## 2021-09-03 PROCEDURE — 0 GADOBENATE DIMEGLUMINE 529 MG/ML SOLUTION: Performed by: FAMILY MEDICINE

## 2021-09-03 PROCEDURE — A9577 INJ MULTIHANCE: HCPCS | Performed by: FAMILY MEDICINE

## 2021-09-03 PROCEDURE — 82565 ASSAY OF CREATININE: CPT

## 2021-09-03 PROCEDURE — 72157 MRI CHEST SPINE W/O & W/DYE: CPT

## 2021-09-03 RX ADMIN — GADOBENATE DIMEGLUMINE 17 ML: 529 INJECTION, SOLUTION INTRAVENOUS at 09:48

## 2022-04-03 ENCOUNTER — HOSPITAL ENCOUNTER (EMERGENCY)
Facility: HOSPITAL | Age: 77
Discharge: HOME | End: 2022-04-03
Attending: EMERGENCY MEDICINE | Admitting: EMERGENCY MEDICINE

## 2022-04-03 VITALS
RESPIRATION RATE: 18 BRPM | TEMPERATURE: 97.5 F | BODY MASS INDEX: 23.03 KG/M2 | DIASTOLIC BLOOD PRESSURE: 92 MMHG | WEIGHT: 170 LBS | HEIGHT: 72 IN | OXYGEN SATURATION: 99 % | SYSTOLIC BLOOD PRESSURE: 171 MMHG | HEART RATE: 73 BPM

## 2022-04-03 DIAGNOSIS — H10.89 OTHER CONJUNCTIVITIS OF RIGHT EYE: Primary | ICD-10-CM

## 2022-04-03 PROCEDURE — 99282 EMERGENCY DEPT VISIT SF MDM: CPT | Performed by: EMERGENCY MEDICINE

## 2022-04-03 PROCEDURE — 99283 EMERGENCY DEPT VISIT LOW MDM: CPT

## 2022-04-03 RX ORDER — PROPARACAINE HYDROCHLORIDE 5 MG/ML
SOLUTION/ DROPS OPHTHALMIC
Status: COMPLETED
Start: 2022-04-03 | End: 2022-04-03

## 2022-04-03 RX ORDER — NAPHAZOLINE HCL 0.012 %
1 DROPS OPHTHALMIC (EYE) 2 TIMES DAILY
Qty: 5 ML | Refills: 0 | Status: SHIPPED | OUTPATIENT
Start: 2022-04-03 | End: 2022-04-06

## 2022-04-03 RX ORDER — CIPROFLOXACIN HYDROCHLORIDE 3.5 MG/ML
1 SOLUTION/ DROPS TOPICAL 4 TIMES DAILY
Qty: 5 ML | Refills: 0 | Status: SHIPPED | OUTPATIENT
Start: 2022-04-03

## 2022-04-03 RX ORDER — PURIFIED WATER 99.05 ML/100ML
SOLUTION OPHTHALMIC
Status: COMPLETED
Start: 2022-04-03 | End: 2022-04-03

## 2022-04-03 RX ORDER — TETRAHYDROZOLINE HCL 0.05 %
1 DROPS OPHTHALMIC (EYE) 2 TIMES DAILY
Status: DISCONTINUED | OUTPATIENT
Start: 2022-04-03 | End: 2022-04-03 | Stop reason: HOSPADM

## 2022-04-03 RX ORDER — PROPARACAINE HYDROCHLORIDE 5 MG/ML
2 SOLUTION/ DROPS OPHTHALMIC ONCE
Status: COMPLETED | OUTPATIENT
Start: 2022-04-03 | End: 2022-04-03

## 2022-04-03 RX ADMIN — PROPARACAINE HYDROCHLORIDE 2 DROP: 5 SOLUTION/ DROPS OPHTHALMIC at 08:05

## 2022-04-03 RX ADMIN — WATER: 99.05 SOLUTION OPHTHALMIC at 08:37

## 2022-04-03 RX ADMIN — FLUORESCEIN SODIUM 1 STRIP: 1 STRIP OPHTHALMIC at 08:06

## 2022-04-03 RX ADMIN — PROPARACAINE HYDROCHLORIDE 2 DROP: 5 SOLUTION/ DROPS OPHTHALMIC at 08:59

## 2022-04-03 NOTE — DISCHARGE INSTRUCTIONS
Follow-up with Dr. Duncan tomorrow.  Return to the emergency department if there is increased redness, increased drainage, change in vision, worse in any way at all.

## 2022-04-03 NOTE — ED PROVIDER NOTES
Subjective   History of Present Illness  History of Present Illness    Chief complaint: Feels like something is in his    Location: Right    Quality/Severity: Mild burning    Timing/Onset/Duration: Started yesterday    Modifying Factors: Nothing makes it better    Associated Symptoms: No change in vision, clear drainage    Narrative: This 76-year-old white male awoke from a nap, and felt like something was in his right.    PCP: Kalyani        Review of Systems   Eyes: Positive for pain, discharge and redness. Negative for photophobia, itching and visual disturbance.       Past Medical History:   Diagnosis Date   • Atrial fibrillation (HCC)    • Constipation    • Duodenal ulcer    • Urinary frequency        No Known Allergies    Past Surgical History:   Procedure Laterality Date   • BACK SURGERY     • CARDIAC ABLATION     • CHOLECYSTECTOMY     • GASTRECTOMY PARTIAL / TOTAL     • REPLACEMENT TOTAL KNEE Bilateral    • THORACIC SPINE SURGERY      arrachnoid bleb removed @ [facility]       Family History   Problem Relation Age of Onset   • No Known Problems Mother    • Heart disease Father    • Irritable bowel syndrome Sister    • Heart disease Brother        Social History     Socioeconomic History   • Marital status:    Tobacco Use   • Smoking status: Former Smoker   • Smokeless tobacco: Never Used   Substance and Sexual Activity   • Alcohol use: Not Currently     Comment: formerly   • Drug use: Not Currently   • Sexual activity: Defer           Objective   Physical Exam  Vitals (The temperature is 97.5 °F, pulse 73, respirations 18, /92, room air pulse ox 99%) and nursing note reviewed.   Constitutional:       Appearance: Normal appearance.   Eyes:      Comments: The lids and lashes are normal.  The conjunctiva is mildly injected.  The optic disc is sharp, the retina is intact.  The pupils equal round reactive light accommodation extraocular muscles intact.  The fluorescein staining is negative.    Neurological:      Mental Status: He is alert and oriented to person, place, and time.         Procedures           ED Course      08:17 EDT, 04/03/22:  The visual acuity in the left eye is 20/30, visual acuity in the right eye is 20/40, visual acuity in both eyes is 20/30.    08:41 EDT, 04/03/22:  Patient's eye was irrigated.  He feels better.  The patient's diagnosis of conjunctivitis was discussed with him.  The patient will be placed on a prescription for small exam.  He should follow-up with Dr. Duncan tomorrow for recheck.  He should return the emergency department if there is increased redness, pain, drainage, change in vision, worse in any way at all.  All the patient's clinic questions were answered he will be discharged in good condition.                                           MDM    Final diagnoses:   Other conjunctivitis of right eye       ED Disposition  ED Disposition     None          No follow-up provider specified.       Medication List      No changes were made to your prescriptions during this visit.          Quique Mccartney MD  04/03/22 4942

## 2023-03-12 ENCOUNTER — APPOINTMENT (OUTPATIENT)
Dept: CT IMAGING | Facility: HOSPITAL | Age: 78
End: 2023-03-12
Payer: MEDICARE

## 2023-03-12 ENCOUNTER — HOSPITAL ENCOUNTER (EMERGENCY)
Facility: HOSPITAL | Age: 78
Discharge: HOME | End: 2023-03-12
Attending: EMERGENCY MEDICINE | Admitting: EMERGENCY MEDICINE
Payer: MEDICARE

## 2023-03-12 VITALS
RESPIRATION RATE: 16 BRPM | BODY MASS INDEX: 24.87 KG/M2 | DIASTOLIC BLOOD PRESSURE: 94 MMHG | WEIGHT: 200 LBS | OXYGEN SATURATION: 99 % | SYSTOLIC BLOOD PRESSURE: 177 MMHG | HEART RATE: 70 BPM | HEIGHT: 75 IN | TEMPERATURE: 97.2 F

## 2023-03-12 DIAGNOSIS — R42 DIZZINESS: ICD-10-CM
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
## 2023-03-12 LAB — QT INTERVAL: 407 MS

## 2023-03-12 PROCEDURE — 93010 ELECTROCARDIOGRAM REPORT: CPT | Performed by: INTERNAL MEDICINE

## 2023-03-12 PROCEDURE — 99282 EMERGENCY DEPT VISIT SF MDM: CPT

## 2023-03-12 PROCEDURE — 72125 CT NECK SPINE W/O DYE: CPT

## 2023-03-12 PROCEDURE — 93005 ELECTROCARDIOGRAM TRACING: CPT | Performed by: EMERGENCY MEDICINE

## 2023-03-12 PROCEDURE — 70450 CT HEAD/BRAIN W/O DYE: CPT

## 2023-03-12 NOTE — ED PROVIDER NOTES
Subjective   History of Present Illness  77-year-old male past medical history significant for atrial fibrillation constipation and past traumatic spinal injury requiring him to walk with crutches who is not on Reported anticoagulation presents the emergency room complaining of fall.  Patient states that around 530 this morning patient had a mechanical fall when his leg got caught up on a quilt that slid off the side of the bed..  He fell onto the floor at that time stating that he hit his knee and his right shoulder in addition to the side of his head.  Patient denies loss of consciousness and was able to stand up normally after his fall.  Patient states he went back to bed but upon awakening 2 hours later he used his crutches to walk to the bathroom and noticed dizziness and stated he almost fell to the floor at that time briefly.  He states that since that time he is taking his dog for a walk and that the nausea and dizziness has resolved however his wife states that when she got him into the car to bring him here he had to catch himself on the side of the car because he was dizzy again.  Patient ate food since he is arrived to the emergency room and states no nausea or vomiting.  Patient has no headache visual changes neck pain or jaw pain.  He states that shoulder and knee are fine and he was able to use crutches and walk around the house since the original incident without any pain to his shoulder and knees.        Review of Systems   Constitutional: Negative for activity change, appetite change, chills, diaphoresis, fatigue and fever.   HENT: Negative for congestion, sinus pressure, sneezing and sore throat.    Eyes: Negative for photophobia and visual disturbance.   Respiratory: Negative for cough and shortness of breath.    Cardiovascular: Negative for chest pain, palpitations and leg swelling.   Gastrointestinal: Positive for nausea. Negative for abdominal distention, abdominal pain, diarrhea and vomiting.    Genitourinary: Negative for dysuria and flank pain.   Musculoskeletal: Negative for arthralgias, back pain and myalgias.   Skin: Negative for rash.   Neurological: Positive for dizziness. Negative for weakness and headaches.   Psychiatric/Behavioral: Negative for behavioral problems and confusion.       Past Medical History:   Diagnosis Date   • Atrial fibrillation (HCC)    • Constipation    • Duodenal ulcer    • Urinary frequency        No Known Allergies    Past Surgical History:   Procedure Laterality Date   • BACK SURGERY     • CARDIAC ABLATION     • CHOLECYSTECTOMY     • GASTRECTOMY PARTIAL / TOTAL     • REPLACEMENT TOTAL KNEE Bilateral    • THORACIC SPINE SURGERY      arrachnoid bleb removed @ [facility]       Family History   Problem Relation Age of Onset   • No Known Problems Mother    • Heart disease Father    • Irritable bowel syndrome Sister    • Heart disease Brother        Social History     Socioeconomic History   • Marital status:    Tobacco Use   • Smoking status: Former   • Smokeless tobacco: Never   Substance and Sexual Activity   • Alcohol use: Not Currently     Comment: formerly   • Drug use: Not Currently   • Sexual activity: Defer           Objective   Physical Exam  Vitals and nursing note reviewed.   Constitutional:       General: He is not in acute distress.     Appearance: Normal appearance. He is not toxic-appearing or diaphoretic.   HENT:      Head: Normocephalic and atraumatic.      Nose: Nose normal.      Mouth/Throat:      Mouth: Mucous membranes are moist.   Eyes:      Extraocular Movements: Extraocular movements intact.      Conjunctiva/sclera: Conjunctivae normal.      Pupils: Pupils are equal, round, and reactive to light.   Cardiovascular:      Rate and Rhythm: Normal rate.      Pulses: Normal pulses.   Pulmonary:      Effort: Pulmonary effort is normal. No respiratory distress.      Breath sounds: No wheezing or rales.   Abdominal:      General: Abdomen is flat. There  is no distension.      Tenderness: There is no abdominal tenderness.   Musculoskeletal:         General: No swelling or signs of injury. Normal range of motion.      Cervical back: Normal range of motion and neck supple.   Skin:     General: Skin is warm and dry.      Findings: No rash.   Neurological:      General: No focal deficit present.      Mental Status: He is alert and oriented to person, place, and time.   Psychiatric:         Mood and Affect: Mood normal.         Behavior: Behavior normal.         Procedures           ED Course  ED Course as of 03/12/23 1750   Sun Mar 12, 2023   1748 CT head and CT C-spine:  IMPRESSION:     1.  No acute intracranial abnormality.  2.  No acute bony injury of the cervical spine.    []   1749 Patient states he feels normal without any abnormalities present time.  Will DC home follow-up as needed with primary care or return emergency room as needed.  Patient states he understands agrees with plan. []   1749 EKG time 1650  Normal sinus rhythm  Heart rate 71  Axes are normal  Intervals are normal  No acute ST abnormalities noted []      ED Course User Index  [] Jaime Grimm MD                                           Medical Decision Making  My differential diagnosis for dizziness included but is not limited to:  Labyrinthitis, vertigo, concussion, intracranial hemorrhage, stroke, migraine headache, cardiac arrhythmias, acute MI, hypotension, hypertension, hypoxia, inner ear and middle ear infections, anemia, electrolyte abnormalities, dehydration, hyperventilation and anxiety attacks..    Amount and/or Complexity of Data Reviewed  Radiology: ordered. Decision-making details documented in ED Course.          Final diagnoses:   Accident due to mechanical fall without injury, initial encounter   Dizziness       ED Disposition  ED Disposition     ED Disposition   Discharge    Condition   Stable    Comment   --             Sue Auguste MD  [address]  [place] [address]  [phone]    Schedule an appointment as soon as possible for a visit   As needed    [facility] Emergency Department  [address] [place] [address] [phone]  Go to   As needed         Medication List      No changes were made to your prescriptions during this visit.          Jaime Grimm MD  03/12/23 9922